# Patient Record
Sex: FEMALE | Employment: FULL TIME | URBAN - METROPOLITAN AREA
[De-identification: names, ages, dates, MRNs, and addresses within clinical notes are randomized per-mention and may not be internally consistent; named-entity substitution may affect disease eponyms.]

---

## 2019-05-16 ENCOUNTER — TRANSFERRED RECORDS (OUTPATIENT)
Dept: MULTI SPECIALTY CLINIC | Facility: CLINIC | Age: 59
End: 2019-05-16
Payer: COMMERCIAL

## 2019-05-16 LAB
CHOLESTEROL (EXTERNAL): 239 MG/DL
HDLC SERPL-MCNC: 55 MG/DL
HPV ABSTRACT: NORMAL
LDL CHOLESTEROL (EXTERNAL): 153 MG/DL
NON HDL CHOLESTEROL (EXTERNAL): 184 MG/DL
PAP SMEAR - HIM PATIENT REPORTED: NORMAL
PAP-ABSTRACT: NORMAL
TRIGLYCERIDES (EXTERNAL): 154 MG/DL

## 2021-10-18 ENCOUNTER — TRANSFERRED RECORDS (OUTPATIENT)
Dept: HEALTH INFORMATION MANAGEMENT | Facility: CLINIC | Age: 61
End: 2021-10-18
Payer: COMMERCIAL

## 2021-10-21 ENCOUNTER — TRANSFERRED RECORDS (OUTPATIENT)
Dept: MULTI SPECIALTY CLINIC | Facility: CLINIC | Age: 61
End: 2021-10-21
Payer: COMMERCIAL

## 2022-01-08 ENCOUNTER — HEALTH MAINTENANCE LETTER (OUTPATIENT)
Age: 62
End: 2022-01-08

## 2022-01-08 ENCOUNTER — LAB REQUISITION (OUTPATIENT)
Dept: LAB | Facility: CLINIC | Age: 62
End: 2022-01-08
Payer: COMMERCIAL

## 2022-01-08 DIAGNOSIS — Z03.818 ENCOUNTER FOR OBSERVATION FOR SUSPECTED EXPOSURE TO OTHER BIOLOGICAL AGENTS RULED OUT: ICD-10-CM

## 2022-01-08 PROCEDURE — U0005 INFEC AGEN DETEC AMPLI PROBE: HCPCS | Mod: ORL | Performed by: FAMILY MEDICINE

## 2022-01-10 ENCOUNTER — VIRTUAL VISIT (OUTPATIENT)
Dept: PEDIATRICS | Facility: CLINIC | Age: 62
End: 2022-01-10
Payer: COMMERCIAL

## 2022-01-10 DIAGNOSIS — Z13.1 SCREENING FOR DIABETES MELLITUS: ICD-10-CM

## 2022-01-10 DIAGNOSIS — Z76.89 ENCOUNTER TO ESTABLISH CARE: Primary | ICD-10-CM

## 2022-01-10 DIAGNOSIS — I10 BENIGN ESSENTIAL HYPERTENSION: ICD-10-CM

## 2022-01-10 DIAGNOSIS — Z13.220 SCREENING CHOLESTEROL LEVEL: ICD-10-CM

## 2022-01-10 PROBLEM — Z86.59 HISTORY OF EATING DISORDER: Status: ACTIVE | Noted: 2022-01-10

## 2022-01-10 LAB — SARS-COV-2 RNA RESP QL NAA+PROBE: NEGATIVE

## 2022-01-10 PROCEDURE — 99203 OFFICE O/P NEW LOW 30 MIN: CPT | Mod: 95 | Performed by: INTERNAL MEDICINE

## 2022-01-10 RX ORDER — AMLODIPINE BESYLATE 5 MG/1
5 TABLET ORAL
COMMUNITY
Start: 2021-10-01 | End: 2022-02-02

## 2022-01-10 NOTE — PROGRESS NOTES
Called and spoke with patient.  Patient is scheduled for nurse visit (fasting labs/bp check) 01/19/2022 at 10:30 am.  LUCAS's mailed to patient along with return envelope to obtain records requested by Dr. Keith.    Clarisse Cooper  Lead

## 2022-01-10 NOTE — PROGRESS NOTES
Debo is a 61 year old who is being evaluated via a billable video visit.      How would you like to obtain your AVS? MyChart  If the video visit is dropped, the invitation should be resent by: Text to cell phone: [phone]  Will anyone else be joining your video visit? No      Video Start Time: 3:49 PM    Assessment & Plan       ICD-10-CM    1. Encounter to establish care  Z76.89    2. Benign essential hypertension  I10      --------------------------  PATIENT INSTRUCTIONS    Patient Instructions   Great to meet you today!    We'll have you over to clinic for fasting labs and a blood pressure check. I will then send in refills for your amlodipine.     We'll work on getting records from [facility] and [facility] - that will help me figure out when we should do an in-person clinic visit (likely this summer)    Be in touch if you need anything!      --------------------------                 Return in about 6 months (around 7/10/2022) for Follow Up.    Carmine Keith MD  [facility] [place]    Subjective   Debo is a 61 year old who presents for the following health issues     HPI     # Weight  # History of eating disorder - binging and purging  - therapy was helpful    # Blood pressure  BP Readings from Last 6 Encounters:   07/07/09 110/64   01/30/09 132/82   11/09/07 100/68   01/09/07 110/74   11/14/06 112/72   10/17/06 118/70     - prior year it was borderline,  - most recent visit was 150/80  - started amlodipine 5mg - told her she needed an internist   - 110s/70s    # HCM  - [facility] for [facility], skipped a year because of covid  --- utd pap  - mammo [facility] 10/2021 wnl  - went to [facility] for colon cancer screening - within the last 5 years; everything was normal, come back in 10 years.    - due lipids, screening labs  - flu shot 11/23/21 (approx)     Review of Systems         Objective           Vitals:  No vitals were obtained today due to virtual visit.    Physical Exam   GENERAL:  Healthy, alert and no distress  EYES: Eyes grossly normal to inspection.  No discharge or erythema, or obvious scleral/conjunctival abnormalities.  RESP: No audible wheeze, cough, or visible cyanosis.  No visible retractions or increased work of breathing.    SKIN: Visible skin clear. No significant rash, abnormal pigmentation or lesions.  NEURO: Cranial nerves grossly intact.  Mentation and speech appropriate for age.  PSYCH: Mentation appears normal, affect normal/bright, judgement and insight intact, normal speech and appearance well-groomed.                Video-Visit Details    Type of service:  Video Visit    Video End Time:4:08 PM    Originating Location (pt. Location): Home    Distant Location (provider location):  [facility] [employer]     Platform used for Video Visit: [employer]

## 2022-01-10 NOTE — PATIENT INSTRUCTIONS
Great to meet you today!    We'll have you over to clinic for fasting labs and a blood pressure check. I will then send in refills for your amlodipine.     We'll work on getting records from [facility] and [facility] - that will help me figure out when we should do an in-person clinic visit (likely this summer)    Be in touch if you need anything!

## 2022-02-01 ENCOUNTER — ALLIED HEALTH/NURSE VISIT (OUTPATIENT)
Dept: PEDIATRICS | Facility: CLINIC | Age: 62
End: 2022-02-01
Payer: COMMERCIAL

## 2022-02-01 VITALS — SYSTOLIC BLOOD PRESSURE: 138 MMHG | DIASTOLIC BLOOD PRESSURE: 88 MMHG | HEART RATE: 86 BPM

## 2022-02-01 DIAGNOSIS — Z13.220 SCREENING CHOLESTEROL LEVEL: ICD-10-CM

## 2022-02-01 DIAGNOSIS — Z13.1 SCREENING FOR DIABETES MELLITUS: ICD-10-CM

## 2022-02-01 DIAGNOSIS — I10 BENIGN ESSENTIAL HYPERTENSION: ICD-10-CM

## 2022-02-01 PROBLEM — R73.01 ELEVATED FASTING GLUCOSE: Status: ACTIVE | Noted: 2022-02-01

## 2022-02-01 LAB
ALBUMIN SERPL-MCNC: 3.2 G/DL (ref 3.4–5)
ALP SERPL-CCNC: 69 U/L (ref 40–150)
ALT SERPL W P-5'-P-CCNC: 23 U/L (ref 0–50)
ANION GAP SERPL CALCULATED.3IONS-SCNC: 5 MMOL/L (ref 3–14)
AST SERPL W P-5'-P-CCNC: 12 U/L (ref 0–45)
BILIRUB SERPL-MCNC: 0.3 MG/DL (ref 0.2–1.3)
BUN SERPL-MCNC: 14 MG/DL (ref 7–30)
CALCIUM SERPL-MCNC: 9 MG/DL (ref 8.5–10.1)
CHLORIDE BLD-SCNC: 105 MMOL/L (ref 94–109)
CHOLEST SERPL-MCNC: 206 MG/DL
CO2 SERPL-SCNC: 27 MMOL/L (ref 20–32)
CREAT SERPL-MCNC: 0.62 MG/DL (ref 0.52–1.04)
CREAT UR-MCNC: 74 MG/DL
ERYTHROCYTE [DISTWIDTH] IN BLOOD BY AUTOMATED COUNT: 12.7 % (ref 10–15)
FASTING STATUS PATIENT QL REPORTED: YES
GFR SERPL CREATININE-BSD FRML MDRD: >90 ML/MIN/1.73M2
GLUCOSE BLD-MCNC: 107 MG/DL (ref 70–99)
HCT VFR BLD AUTO: 44.2 % (ref 35–47)
HDLC SERPL-MCNC: 50 MG/DL
HGB BLD-MCNC: 14.4 G/DL (ref 11.7–15.7)
LDLC SERPL CALC-MCNC: 119 MG/DL
MCH RBC QN AUTO: 29 PG (ref 26.5–33)
MCHC RBC AUTO-ENTMCNC: 32.6 G/DL (ref 31.5–36.5)
MCV RBC AUTO: 89 FL (ref 78–100)
MICROALBUMIN UR-MCNC: 67 MG/L
MICROALBUMIN/CREAT UR: 90.54 MG/G CR (ref 0–25)
NONHDLC SERPL-MCNC: 156 MG/DL
PLATELET # BLD AUTO: 397 10E3/UL (ref 150–450)
POTASSIUM BLD-SCNC: 4 MMOL/L (ref 3.4–5.3)
PROT SERPL-MCNC: 7.6 G/DL (ref 6.8–8.8)
RBC # BLD AUTO: 4.96 10E6/UL (ref 3.8–5.2)
SODIUM SERPL-SCNC: 137 MMOL/L (ref 133–144)
TRIGL SERPL-MCNC: 187 MG/DL
TSH SERPL DL<=0.005 MIU/L-ACNC: 3.52 MU/L (ref 0.4–4)
WBC # BLD AUTO: 10.3 10E3/UL (ref 4–11)

## 2022-02-01 PROCEDURE — 85027 COMPLETE CBC AUTOMATED: CPT

## 2022-02-01 PROCEDURE — 80061 LIPID PANEL: CPT

## 2022-02-01 PROCEDURE — 80053 COMPREHEN METABOLIC PANEL: CPT

## 2022-02-01 PROCEDURE — 82043 UR ALBUMIN QUANTITATIVE: CPT

## 2022-02-01 PROCEDURE — 99207 PR NO CHARGE NURSE ONLY: CPT

## 2022-02-01 PROCEDURE — 36415 COLL VENOUS BLD VENIPUNCTURE: CPT

## 2022-02-01 PROCEDURE — 84443 ASSAY THYROID STIM HORMONE: CPT

## 2022-02-01 NOTE — PROGRESS NOTES
BP borderline here.     She works in healthcare. Has she been checking it at work? Is it still running 110s/70s for her? If so I'll refill the amlodipine 5. Otherwise if running higher/closer to our check in clinic I'll likely increase to amlodipine 10.     ALICJA Keith MD  Internal Medicine-Pediatrics

## 2022-02-01 NOTE — PROGRESS NOTES
Debo Grimes is a 61 year old patient who comes in today for a Blood Pressure check.  Initial BP:  /88 (BP Location: Right arm, Patient Position: Chair, Cuff Size: Adult Large)   Pulse 86          1st readin/100  P: 88     2nd readin/88 P: 86      Disposition: results routed to provider    No complaints.     No side effects.    No symptoms.      Arleen Harrell MA// 2022

## 2022-02-01 NOTE — PROGRESS NOTES
Called and left message for patient requesting a call back. Tali Carrero RN on 2/1/2022 at 2:05 PM

## 2022-02-02 ENCOUNTER — MYC MEDICAL ADVICE (OUTPATIENT)
Dept: PEDIATRICS | Facility: CLINIC | Age: 62
End: 2022-02-02
Payer: COMMERCIAL

## 2022-02-02 RX ORDER — LISINOPRIL 20 MG/1
20 TABLET ORAL DAILY
Qty: 90 TABLET | Refills: 0 | Status: SHIPPED | OUTPATIENT
Start: 2022-02-02 | End: 2022-05-05

## 2022-02-04 NOTE — TELEPHONE ENCOUNTER
Per chart review:   lisinopril (ZESTRIL) 20 MG tablet 90 tablet 0 2/2/2022  --   Sig - Route: Take 1 tablet (20 mg) by mouth daily - Oral   Sent to pharmacy as: Lisinopril 20 MG Oral Tablet (ZESTRIL)   Class: E-Prescribe   Order: [id number]   E-Prescribing Status: Receipt confirmed by pharmacy (2/2/2022  1:15 PM CST)     [employer] message sent to patient.     Ata CALVERT RN

## 2022-02-09 ENCOUNTER — TELEPHONE (OUTPATIENT)
Dept: PEDIATRICS | Facility: CLINIC | Age: 62
End: 2022-02-09
Payer: COMMERCIAL

## 2022-02-09 NOTE — TELEPHONE ENCOUNTER
She filled out 2 ROIs last visit and I do not see records yet:    LUCAS for [facility] - colonoscopy results last 5 years   LUCAS for [facility]  - labs, last 5 years of paps, appt notes last 3 years     Thank you!  ALICJA Keith MD  Internal Medicine-Pediatrics

## 2022-02-21 NOTE — TELEPHONE ENCOUNTER
[facility] sent over records. Placed in providers [facility].     Ramona Leija MA

## 2022-05-04 DIAGNOSIS — I10 BENIGN ESSENTIAL HYPERTENSION: ICD-10-CM

## 2022-05-05 RX ORDER — LISINOPRIL 20 MG/1
20 TABLET ORAL DAILY
Qty: 90 TABLET | Refills: 0 | Status: SHIPPED | OUTPATIENT
Start: 2022-05-05 | End: 2022-08-18

## 2022-05-05 NOTE — TELEPHONE ENCOUNTER
Prescription approved per [facility] Refill Protocol.    01/10/2022 Carmine Keith MD Virtual Visit  Return in about 6 months (around 7/10/2022) for Follow Up.     Cielo Aguilera RN

## 2022-05-18 NOTE — TELEPHONE ENCOUNTER
We got [facility] records but still need [facility] (pap).     Thanks!   ALICJA Keith MD  Internal Medicine-Pediatrics

## 2022-05-23 NOTE — TELEPHONE ENCOUNTER
Last pap 5/16/19 - records will be abstracted.     ALICJA Keith MD  Internal Medicine-Pediatrics

## 2022-08-15 DIAGNOSIS — I10 BENIGN ESSENTIAL HYPERTENSION: ICD-10-CM

## 2022-08-18 RX ORDER — LISINOPRIL 20 MG/1
20 TABLET ORAL DAILY
Qty: 90 TABLET | Refills: 1 | Status: SHIPPED | OUTPATIENT
Start: 2022-08-18 | End: 2023-02-12

## 2022-08-18 NOTE — TELEPHONE ENCOUNTER
Prescription approved per [facility] Refill Protocol.  Gracy Loya, RN, BSN, PHN  [facility]

## 2022-11-20 ENCOUNTER — HEALTH MAINTENANCE LETTER (OUTPATIENT)
Age: 62
End: 2022-11-20

## 2022-12-24 ENCOUNTER — HEALTH MAINTENANCE LETTER (OUTPATIENT)
Age: 62
End: 2022-12-24

## 2023-01-13 ENCOUNTER — E-VISIT (OUTPATIENT)
Dept: PEDIATRICS | Facility: CLINIC | Age: 63
End: 2023-01-13
Payer: COMMERCIAL

## 2023-01-13 DIAGNOSIS — M10.9 ACUTE GOUT OF ANKLE, UNSPECIFIED CAUSE, UNSPECIFIED LATERALITY: Primary | ICD-10-CM

## 2023-01-13 PROCEDURE — 99422 OL DIG E/M SVC 11-20 MIN: CPT | Performed by: INTERNAL MEDICINE

## 2023-01-16 RX ORDER — INDOMETHACIN 50 MG/1
50 CAPSULE ORAL
Qty: 30 CAPSULE | Refills: 0 | Status: SHIPPED | OUTPATIENT
Start: 2023-01-16 | End: 2023-11-08

## 2023-01-17 NOTE — PATIENT INSTRUCTIONS
Thank you for choosing us for your care. I have placed an order for a prescription so that you can start treatment. View your full visit summary for details by clicking on the link below. Your pharmacist will able to address any questions you may have about the medication.     If you're not feeling better within 5-7 days, please schedule an appointment.  You can schedule an appointment right here in [facility], or call [phone]  If the visit is for the same symptoms as your eVisit, we'll refund the cost of your eVisit if seen within seven days.

## 2023-02-10 DIAGNOSIS — I10 BENIGN ESSENTIAL HYPERTENSION: ICD-10-CM

## 2023-02-12 RX ORDER — LISINOPRIL 20 MG/1
20 TABLET ORAL DAILY
Qty: 90 TABLET | Refills: 0 | Status: SHIPPED | OUTPATIENT
Start: 2023-02-12 | End: 2023-09-20

## 2023-09-20 DIAGNOSIS — I10 BENIGN ESSENTIAL HYPERTENSION: ICD-10-CM

## 2023-09-20 NOTE — TELEPHONE ENCOUNTER
Routing refill request to provider for review/approval because:  Drug interaction warning  Labs not current:  potassium, creatinine, BP  Patient needs to be seen because it has been more than 1 year since last office visit.    Ata CALVERT RN 9/20/2023 at 4:00 PM

## 2023-09-20 NOTE — TELEPHONE ENCOUNTER
Medication Question or Refill        What medication are you calling about (include dose and sig)?: Lisinopril 20mg     Preferred Pharmacy: [facility] [phone] [address] [facility]  [address] [address]  Phone: [phone] Fax: [phone] Alternate Fax: [phone]    [id number] - [place] - [address] AT [facility]  [address] [place] [address]  Phone: [phone] Fax: [phone]      Controlled Substance Agreement on file:   CSA -- Patient Level:    CSA: None found at the patient level.       Who prescribed the medication?: Dr. Keith     Do you need a refill? Yes will be out before the appt set for 11/8/23    When did you use the medication last? 9/20/23    Patient offered an appointment? Yes: annual physical and med renewal set for 11/8/23 but will need refill before that     Do you have any questions or concerns?  No      Could we send this information to you in [facility] or would you prefer to receive a phone call?:   Patient would prefer a phone call   Okay to leave a detailed message?: Yes at Cell number on file:    Telephone Information:   Mobile [phone]

## 2023-09-21 RX ORDER — LISINOPRIL 20 MG/1
20 TABLET ORAL DAILY
Qty: 90 TABLET | Refills: 0 | Status: SHIPPED | OUTPATIENT
Start: 2023-09-21 | End: 2023-11-08

## 2023-09-30 ENCOUNTER — TRANSFERRED RECORDS (OUTPATIENT)
Dept: MULTI SPECIALTY CLINIC | Facility: CLINIC | Age: 63
End: 2023-09-30

## 2023-09-30 LAB — PAP SMEAR - HIM PATIENT REPORTED: NORMAL

## 2023-11-01 ASSESSMENT — ENCOUNTER SYMPTOMS
MYALGIAS: 0
DYSURIA: 0
SORE THROAT: 0
HEADACHES: 0
BREAST MASS: 0
FREQUENCY: 0
DIARRHEA: 0
ARTHRALGIAS: 0
NERVOUS/ANXIOUS: 0
PARESTHESIAS: 0
HEARTBURN: 0
SHORTNESS OF BREATH: 0
WEAKNESS: 0
CHILLS: 0
HEMATURIA: 0
JOINT SWELLING: 0
DIZZINESS: 0
HEMATOCHEZIA: 0
EYE PAIN: 0
ABDOMINAL PAIN: 0
NAUSEA: 0
CONSTIPATION: 0
COUGH: 0
FEVER: 0
PALPITATIONS: 0

## 2023-11-06 NOTE — PROGRESS NOTES
Est care 1/2022  # Weight  # History of eating disorder - binging and purging  - therapy was helpful     # Blood pressure  BP Readings from Last 6 Encounters:   02/01/22 138/88   07/07/09 110/64   01/30/09 132/82   11/09/07 100/68   01/09/07 110/74   11/14/06 112/72     - started amlodipine 5mg - told her she needed an internist   - 110s/70s     # HCM  - [facility] for [facility], skipped a year because of covid  --- utd pap  - mammo [facility] 10/2021 wnl  - went to [facility] for colon cancer screening - within the last 5 years; everything was normal, come back in 10 years.     # HCM  - due covid/flu  - due RSV  - due shingles  - mammo completed 9/20/23 [facility]  - colon due 5/2024     Latest Reference Range & Units 02/01/22 10:03   Cholesterol <200 mg/dL 206 (H)   HDL Cholesterol >=50 mg/dL 50   LDL Cholesterol Calculated <=100 mg/dL 119 (H)   Triglycerides <150 mg/dL 187 (H)   (H): Data is abnormally high

## 2023-11-08 ENCOUNTER — OFFICE VISIT (OUTPATIENT)
Dept: PEDIATRICS | Facility: CLINIC | Age: 63
End: 2023-11-08
Payer: COMMERCIAL

## 2023-11-08 VITALS
HEART RATE: 104 BPM | OXYGEN SATURATION: 96 % | BODY MASS INDEX: 50.02 KG/M2 | TEMPERATURE: 97.6 F | HEIGHT: 64 IN | WEIGHT: 293 LBS | SYSTOLIC BLOOD PRESSURE: 118 MMHG | RESPIRATION RATE: 16 BRPM | DIASTOLIC BLOOD PRESSURE: 76 MMHG

## 2023-11-08 DIAGNOSIS — Z11.4 SCREENING FOR HIV (HUMAN IMMUNODEFICIENCY VIRUS): ICD-10-CM

## 2023-11-08 DIAGNOSIS — Z11.59 NEED FOR HEPATITIS C SCREENING TEST: ICD-10-CM

## 2023-11-08 DIAGNOSIS — Z00.00 ROUTINE GENERAL MEDICAL EXAMINATION AT A HEALTH CARE FACILITY: Primary | ICD-10-CM

## 2023-11-08 DIAGNOSIS — I10 BENIGN ESSENTIAL HYPERTENSION: ICD-10-CM

## 2023-11-08 DIAGNOSIS — Z13.1 SCREENING FOR DIABETES MELLITUS: ICD-10-CM

## 2023-11-08 DIAGNOSIS — Z12.11 COLON CANCER SCREENING: ICD-10-CM

## 2023-11-08 PROBLEM — R73.03 PREDIABETES: Status: ACTIVE | Noted: 2022-02-01

## 2023-11-08 LAB
ANION GAP SERPL CALCULATED.3IONS-SCNC: 11 MMOL/L (ref 7–15)
BUN SERPL-MCNC: 11.5 MG/DL (ref 8–23)
CALCIUM SERPL-MCNC: 9.7 MG/DL (ref 8.8–10.2)
CHLORIDE SERPL-SCNC: 100 MMOL/L (ref 98–107)
CREAT SERPL-MCNC: 0.88 MG/DL (ref 0.51–0.95)
DEPRECATED HCO3 PLAS-SCNC: 24 MMOL/L (ref 22–29)
EGFRCR SERPLBLD CKD-EPI 2021: 74 ML/MIN/1.73M2
GLUCOSE SERPL-MCNC: 101 MG/DL (ref 70–99)
HBA1C MFR BLD: 6.1 % (ref 0–5.6)
POTASSIUM SERPL-SCNC: 4.7 MMOL/L (ref 3.4–5.3)
SODIUM SERPL-SCNC: 135 MMOL/L (ref 135–145)

## 2023-11-08 PROCEDURE — 90678 RSV VACC PREF BIVALENT IM: CPT | Performed by: INTERNAL MEDICINE

## 2023-11-08 PROCEDURE — 99396 PREV VISIT EST AGE 40-64: CPT | Mod: 25 | Performed by: INTERNAL MEDICINE

## 2023-11-08 PROCEDURE — 91320 SARSCV2 VAC 30MCG TRS-SUC IM: CPT | Performed by: INTERNAL MEDICINE

## 2023-11-08 PROCEDURE — 36415 COLL VENOUS BLD VENIPUNCTURE: CPT | Performed by: INTERNAL MEDICINE

## 2023-11-08 PROCEDURE — 99213 OFFICE O/P EST LOW 20 MIN: CPT | Mod: 25 | Performed by: INTERNAL MEDICINE

## 2023-11-08 PROCEDURE — 90471 IMMUNIZATION ADMIN: CPT | Performed by: INTERNAL MEDICINE

## 2023-11-08 PROCEDURE — 80048 BASIC METABOLIC PNL TOTAL CA: CPT | Performed by: INTERNAL MEDICINE

## 2023-11-08 PROCEDURE — 90480 ADMN SARSCOV2 VAC 1/ONLY CMP: CPT | Performed by: INTERNAL MEDICINE

## 2023-11-08 PROCEDURE — 86803 HEPATITIS C AB TEST: CPT | Performed by: INTERNAL MEDICINE

## 2023-11-08 PROCEDURE — 83036 HEMOGLOBIN GLYCOSYLATED A1C: CPT | Performed by: INTERNAL MEDICINE

## 2023-11-08 PROCEDURE — 87389 HIV-1 AG W/HIV-1&-2 AB AG IA: CPT | Performed by: INTERNAL MEDICINE

## 2023-11-08 PROCEDURE — 90472 IMMUNIZATION ADMIN EACH ADD: CPT | Performed by: INTERNAL MEDICINE

## 2023-11-08 PROCEDURE — 90686 IIV4 VACC NO PRSV 0.5 ML IM: CPT | Performed by: INTERNAL MEDICINE

## 2023-11-08 RX ORDER — LISINOPRIL 20 MG/1
20 TABLET ORAL DAILY
Qty: 90 TABLET | Refills: 3 | Status: SHIPPED | OUTPATIENT
Start: 2023-11-08

## 2023-11-08 ASSESSMENT — PAIN SCALES - GENERAL: PAINLEVEL: MILD PAIN (2)

## 2023-11-08 ASSESSMENT — ENCOUNTER SYMPTOMS
MYALGIAS: 0
SORE THROAT: 0
CONSTIPATION: 0
BREAST MASS: 0
COUGH: 0
DIZZINESS: 0
DYSURIA: 0
CHILLS: 0
WEAKNESS: 0
HEMATOCHEZIA: 0
SHORTNESS OF BREATH: 0
FREQUENCY: 0
HEARTBURN: 0
JOINT SWELLING: 0
PARESTHESIAS: 0
NERVOUS/ANXIOUS: 0
FEVER: 0
HEADACHES: 0
EYE PAIN: 0
ARTHRALGIAS: 0
PALPITATIONS: 0
NAUSEA: 0
ABDOMINAL PAIN: 0
HEMATURIA: 0
DIARRHEA: 0

## 2023-11-08 NOTE — PROGRESS NOTES
SUBJECTIVE:   CC: Debo is an 63 year old who presents for preventive health visit.       11/8/2023    10:51 AM   Additional Questions   Roomed by josesito   Accompanied by self         11/8/2023    10:51 AM   Patient Reported Additional Medications   Patient reports taking the following new medications no       Healthy Habits:     Getting at least 3 servings of Calcium per day:  NO    Bi-annual eye exam:  Yes    Dental care twice a year:  Yes    Sleep apnea or symptoms of sleep apnea:  None    Diet:  Regular (no restrictions)    Frequency of exercise:  4-5 days/week    Duration of exercise:  15-30 minutes    Taking medications regularly:  Yes    Medication side effects:  None    Additional concerns today:  Yes    # Weight  # History of eating disorder - binging and purging  - therapy was helpful     # Blood pressure  BP Readings from Last 6 Encounters:   11/08/23 118/76   02/01/22 138/88   07/07/09 110/64   01/30/09 132/82   11/09/07 100/68   01/09/07 110/74     - amlodipine 5mg     # HCM  - [facility]  --- utd pap  - due covid/flu  - due RSV  - due shingles  - mammo completed 9/20/23 [facility]  - colon due 5/2024     Latest Reference Range & Units 02/01/22 10:03   Cholesterol <200 mg/dL 206 (H)   HDL Cholesterol >=50 mg/dL 50   LDL Cholesterol Calculated <=100 mg/dL 119 (H)   Triglycerides <150 mg/dL 187 (H)   (H): Data is abnormally high    Today's PHQ-2 Score:       11/8/2023     5:40 AM   PHQ-2 ( 1999 Pf[employer])   Q1: Little interest or pleasure in doing things 0   Q2: Feeling down, depressed or hopeless 0   PHQ-2 Score 0   Q1: Little interest or pleasure in doing things Not at all   Q2: Feeling down, depressed or hopeless Not at all   PHQ-2 Score 0       ave you ever done Advance Care Planning? (For example, a Health Directive, POLST, or a discussion with a medical provider or your loved ones about your wishes): No, advance care planning information given to patient to review.  Patient plans to discuss  their wishes with loved ones or provider.      Social History     Tobacco Use    Smoking status: Never    Smokeless tobacco: Never   Substance Use Topics    Alcohol use: Yes     Comment: 1-2 drinks/wk             11/1/2023    11:41 AM   Alcohol Use   Prescreen: >3 drinks/day or >7 drinks/week? No     Reviewed orders with patient.  Reviewed health maintenance and updated orders accordingly - Yes    Breast Cancer Screening:    FHS-7:       11/1/2023    11:47 AM   Breast CA Risk Assessment (FHS-7)   Did any of your first-degree relatives have breast or ovarian cancer? No   Did any of your relatives have bilateral breast cancer? No   Did any man in your family have breast cancer? No   Did any woman in your family have breast and ovarian cancer? Yes   Did any woman in your family have breast cancer before age 50 y? No   Do you have 2 or more relatives with breast and/or ovarian cancer? Yes   Do you have 2 or more relatives with breast and/or bowel cancer? Yes     Pertinent mammograms are reviewed under the imaging tab.    History of abnormal Pap smear: NO - age 30-65 PAP every 5 years with negative HPV co-testing recommended      1/30/2009    12:00 AM 8/12/2005    12:00 AM   PAP / HPV   PAP (Historical) NIL  NIL      Reviewed and updated as needed this visit by clinical staff   Tobacco  Allergies  Meds              Reviewed and updated as needed this visit by Provider                   Review of Systems   Constitutional:  Negative for chills and fever.   HENT:  Negative for congestion, ear pain, hearing loss and sore throat.    Eyes:  Negative for pain and visual disturbance.   Respiratory:  Negative for cough and shortness of breath.    Cardiovascular:  Negative for chest pain, palpitations and peripheral edema.   Gastrointestinal:  Negative for abdominal pain, constipation, diarrhea, heartburn, hematochezia and nausea.   Breasts:  Negative for tenderness, breast mass and discharge.   Genitourinary:  Negative for  "dysuria, frequency, genital sores, hematuria, pelvic pain, urgency, vaginal bleeding and vaginal discharge.   Musculoskeletal:  Negative for arthralgias, joint swelling and myalgias.   Skin:  Negative for rash.   Neurological:  Negative for dizziness, weakness, headaches and paresthesias.   Psychiatric/Behavioral:  Negative for mood changes. The patient is not nervous/anxious.         OBJECTIVE:   /76 (Cuff Size: Adult Large)   Pulse 104   Temp 97.6  F (36.4  C) (Tympanic)   Resp 16   Ht 1.613 m (5' 3.5\")   Wt 135.6 kg (299 lb)   SpO2 96%   BMI 52.13 kg/m    Physical Exam  GENERAL: healthy, alert and no distress  EYES: Eyes grossly normal to inspection, PERRL and conjunctivae and sclerae normal  HENT: ear canals and TM's normal, nose and mouth without ulcers or lesions  NECK: no adenopathy, no asymmetry, masses, or scars and thyroid normal to palpation  RESP: lungs clear to auscultation - no rales, rhonchi or wheezes  CV: regular rate and rhythm, normal S1 S2, no S3 or S4, no murmur, click or rub, no peripheral edema and peripheral pulses strong  ABDOMEN: soft, nontender, no hepatosplenomegaly, no masses and bowel sounds normal  MS: no gross musculoskeletal defects noted, no edema  SKIN: no suspicious lesions or rashes  NEURO: Normal strength and tone, mentation intact and speech normal  PSYCH: mentation appears normal, affect normal/bright    Diagnostic Test Results:  Labs reviewed in Epic    ASSESSMENT/PLAN:       ICD-10-CM    1. Routine general medical examination at a health care facility  Z00.00       2. Benign essential hypertension  I10 lisinopril (ZESTRIL) 20 MG tablet     Basic metabolic panel  (Ca, Cl, CO2, Creat, Gluc, K, Na, BUN)     OFFICE/OUTPT VISIT,EST,LEVL III     Basic metabolic panel  (Ca, Cl, CO2, Creat, Gluc, K, Na, BUN)      3. Need for hepatitis C screening test  Z11.59 Hepatitis C Screen Reflex to HCV RNA Quant and Genotype     Hepatitis C Screen Reflex to HCV RNA Quant and " Genotype      4. Screening for HIV (human immunodeficiency virus)  Z11.4 HIV Antigen Antibody Combo     HIV Antigen Antibody Combo      5. Screening for diabetes mellitus  Z13.1 Hemoglobin A1c     Hemoglobin A1c      6. Colon cancer screening  Z12.11 Colonoscopy Screening  Referral        Doing great - assisted is wonderful!      COUNSELING:  Reviewed preventive health counseling, as reflected in patient instructions        She reports that she has never smoked. She has never used smokeless tobacco.        Carmine Keith MD  [facility]

## 2023-11-08 NOTE — PATIENT INSTRUCTIONS
If you are over 50 I recommend the Shingrix vaccine. Two doses of Shingrix provides more than 90% protection against shingles and postherpetic neuralgia (PHN), a type of chronic pain that is the most common complication of shingles.   - even if you've had the older shingles vaccine (Zostavax) it's okay to get the new vaccine.   - even if you've had singles before the vaccine can be helpful.    Typically the best (and cheapest!) place to get this vaccine is our pharmacy downstairs. The vaccine is a two shot series - I recommend getting the second shot 2-6 months after the first dose.    You may have a sore arm and feel mild flu-like symptoms for a day or two after the vaccine. Most people do not need to adjust their regular activities. It's okay to take tylenol or ibuprofen if you have side effects.      Preventive Health Recommendations  Female Ages 50 - 64    Yearly exam: See your health care provider every year in order to  Review health changes.   Discuss preventive care.    Review your medicines if your doctor has prescribed any.    Get a Pap test every three years (unless you have an abnormal result and your provider advises testing more often).  If you get Pap tests with HPV test, you only need to test every 5 years, unless you have an abnormal result.   You do not need a Pap test if your uterus was removed (hysterectomy) and you have not had cancer.  You should be tested each year for STDs (sexually transmitted diseases) if you're at risk.   Have a mammogram every 1 to 2 years.  Have a colonoscopy at age 45, or have a yearly FIT test (stool test). These exams screen for colon cancer.    Have a cholesterol test every 5 years, or more often if advised.  Have a diabetes test (fasting glucose) every three years. If you are at risk for diabetes, you should have this test more often.   If you are at risk for osteoporosis (brittle bone disease), think about having a bone density scan (DEXA).    Shots: Get a flu shot  each year. Get a tetanus shot every 10 years.    Nutrition:   Eat at least 5 servings of fruits and vegetables each day.  Eat whole-grain bread, whole-wheat pasta and brown rice instead of white grains and rice.  Get adequate Calcium and Vitamin D.     Lifestyle  Exercise at least 150 minutes a week (30 minutes a day, 5 days a week). This will help you control your weight and prevent disease.  Limit alcohol to one drink per day.  No smoking.   Wear sunscreen to prevent skin cancer.   See your dentist every six months for an exam and cleaning.  See your eye doctor every 1 to 2 years.

## 2023-11-09 LAB
HCV AB SERPL QL IA: NONREACTIVE
HIV 1+2 AB+HIV1 P24 AG SERPL QL IA: NONREACTIVE

## 2025-01-04 ENCOUNTER — HEALTH MAINTENANCE LETTER (OUTPATIENT)
Age: 65
End: 2025-01-04

## 2025-01-31 DIAGNOSIS — I10 BENIGN ESSENTIAL HYPERTENSION: ICD-10-CM

## 2025-01-31 RX ORDER — LISINOPRIL 20 MG/1
20 TABLET ORAL DAILY
Qty: 90 TABLET | Refills: 0 | Status: SHIPPED | OUTPATIENT
Start: 2025-01-31

## 2025-01-31 NOTE — TELEPHONE ENCOUNTER
Due for Wellness - I am precepting several of the next Wed AMs - would she be okay with seeing resident/me in resident clinic?     Also due for Mammo - okay to schedule prior to seeing me.    ALICJA Keith MD  Internal Medicine-Pediatrics

## 2025-01-31 NOTE — LETTER
[facility]  [address]  [address]  [place] [address]  Phone: [phone]  Fax: [phone]        February 5, 2025      Debo Grimes                                                                                                                                [address] [place] [address]            Dear Ms. Grimes,    We have attempted to reach you multiple times because we are concerned about your health care.  We recently provided you with a medication refill.  Many medications require routine follow-up with your Doctor.      At this time we ask that: You schedule an appointment for your annual physical and a mammogram.    Your prescription lisinopril (ZESTRIL) 20 MG tablet: Has been refilled for 90 day supply only so you may have time for the above noted follow-up.    Please call us at [phone] to schedule your annual preventative as well as your mammogram. If you have any questions or concerns, you can call the number mentioned above as well.     Thank you,      [facility] Care Team

## 2025-04-23 ENCOUNTER — OFFICE VISIT (OUTPATIENT)
Dept: PEDIATRICS | Facility: CLINIC | Age: 65
End: 2025-04-23
Payer: COMMERCIAL

## 2025-04-23 VITALS
RESPIRATION RATE: 16 BRPM | HEIGHT: 64 IN | WEIGHT: 286.9 LBS | SYSTOLIC BLOOD PRESSURE: 130 MMHG | BODY MASS INDEX: 48.98 KG/M2 | OXYGEN SATURATION: 98 % | TEMPERATURE: 97.4 F | HEART RATE: 81 BPM | DIASTOLIC BLOOD PRESSURE: 88 MMHG

## 2025-04-23 DIAGNOSIS — I10 BENIGN ESSENTIAL HYPERTENSION: ICD-10-CM

## 2025-04-23 DIAGNOSIS — E55.9 VITAMIN D DEFICIENCY: ICD-10-CM

## 2025-04-23 DIAGNOSIS — Z13.1 SCREENING FOR DIABETES MELLITUS: ICD-10-CM

## 2025-04-23 DIAGNOSIS — R73.03 PREDIABETES: ICD-10-CM

## 2025-04-23 DIAGNOSIS — Z13.220 SCREENING CHOLESTEROL LEVEL: ICD-10-CM

## 2025-04-23 DIAGNOSIS — Z00.00 ROUTINE GENERAL MEDICAL EXAMINATION AT A HEALTH CARE FACILITY: Primary | ICD-10-CM

## 2025-04-23 LAB
EST. AVERAGE GLUCOSE BLD GHB EST-MCNC: 137 MG/DL
HBA1C MFR BLD: 6.4 % (ref 0–5.6)

## 2025-04-23 PROCEDURE — 90472 IMMUNIZATION ADMIN EACH ADD: CPT | Performed by: INTERNAL MEDICINE

## 2025-04-23 PROCEDURE — 36415 COLL VENOUS BLD VENIPUNCTURE: CPT | Performed by: INTERNAL MEDICINE

## 2025-04-23 PROCEDURE — 90677 PCV20 VACCINE IM: CPT | Performed by: INTERNAL MEDICINE

## 2025-04-23 PROCEDURE — 82306 VITAMIN D 25 HYDROXY: CPT | Performed by: INTERNAL MEDICINE

## 2025-04-23 PROCEDURE — 90750 HZV VACC RECOMBINANT IM: CPT | Performed by: INTERNAL MEDICINE

## 2025-04-23 PROCEDURE — 99396 PREV VISIT EST AGE 40-64: CPT | Mod: 25 | Performed by: INTERNAL MEDICINE

## 2025-04-23 PROCEDURE — 80061 LIPID PANEL: CPT | Performed by: INTERNAL MEDICINE

## 2025-04-23 PROCEDURE — 90471 IMMUNIZATION ADMIN: CPT | Performed by: INTERNAL MEDICINE

## 2025-04-23 PROCEDURE — 83036 HEMOGLOBIN GLYCOSYLATED A1C: CPT | Performed by: INTERNAL MEDICINE

## 2025-04-23 PROCEDURE — 80053 COMPREHEN METABOLIC PANEL: CPT | Performed by: INTERNAL MEDICINE

## 2025-04-23 RX ORDER — LISINOPRIL 20 MG/1
20 TABLET ORAL DAILY
Qty: 90 TABLET | Refills: 3 | Status: SHIPPED | OUTPATIENT
Start: 2025-04-23

## 2025-04-23 SDOH — HEALTH STABILITY: PHYSICAL HEALTH: ON AVERAGE, HOW MANY DAYS PER WEEK DO YOU ENGAGE IN MODERATE TO STRENUOUS EXERCISE (LIKE A BRISK WALK)?: 3 DAYS

## 2025-04-23 SDOH — HEALTH STABILITY: PHYSICAL HEALTH: ON AVERAGE, HOW MANY MINUTES DO YOU ENGAGE IN EXERCISE AT THIS LEVEL?: 20 MIN

## 2025-04-23 ASSESSMENT — PAIN SCALES - GENERAL: PAINLEVEL_OUTOF10: NO PAIN (0)

## 2025-04-23 ASSESSMENT — SOCIAL DETERMINANTS OF HEALTH (SDOH): HOW OFTEN DO YOU GET TOGETHER WITH FRIENDS OR RELATIVES?: TWICE A WEEK

## 2025-04-23 NOTE — PATIENT INSTRUCTIONS
Rechecking blood pressure in 6 months (don't need to see me)    GLP1s  - Zepbound, Saxenda, Wegovy    [facility] is probably my best resource for up-to-date GLP1 info. They will call you to schedule- tell them virtual.    Schedule mammo and with [facility] (have them send me the records when done).     ----------------    Options for continuing GLP1/GIP agonist in 2025:  Pay out of pocket for Wegovy or Zepbound pens (>$1000/month cash price without savings card)   Zepbound cost with Zepbound savings card (link below to sign up for this): $650/month with card  https://www.enrollment.[employer] Health.[employer])/enroll/checkEnrollment  Wegovy cost with Wegovy savings card (link below to sign up for this): $650/month with card   https://www.[employer]/coverage-and-savings/save-on-wegovy.html    Zepbound Vials through [facility] CASH PAY pharmacy - vials only available in 2.5 mg, 5 mg, 7.5mg, 10 mg doses  [email or web address] --please make an account  $349/month for 2.5mg vials  $499/month for 5mg vials   7.5 mg and 10 mg vials now available with as well for $499/month with regular refills (cost increases if refills not consistently filled - see more info at [employer] website)  $5 per month for administrations supplies (syringe/needles, etc)     Wegovy pen needles through [facility] - 0.25 mg, 0.5mg, 1mg, 1.7mg and 2.4 mg pens  Provides direct-to-patient, convenient home shipments of all dose strengths of Wegovy   $499 per month or 28 day supply for cash-paying patients  https://www.[employer]/obesity/products/wegovy/get-product.html    Liraglutide daily injections may be a more affordable option with GoodRx coupon (around $330/month at some pharmacies      Sublingual Semaglutide at [facility]  [facility] is now offering sublingual semaglutide. This is an alternative option for patients who may not have access to or insurance coverage for  commercially available semaglutide products.  Currently, the FDA-approved forms of semaglutide include:  Rybelsus (oral tablet) - approved for Type 2 Diabetes  Ozempic (injectable) - approved for Type 2 Diabetes  Wegovy (injectable) - approved for chronic weight management and cardiovascular risk reduction in certain populations  Unlike FDA-approved products, sublingual semaglutide has not been studied in large clinical trials, so its effectiveness and safety in this form are not fully known. However, it may be a helpful alternative when other formulations are not accessible.    Sublingual means the medication is placed under the tongue, where it dissolves and is absorbed directly into the bloodstream. At [facility], sublingual semaglutide is made by combining commercially available Rybelsus tablets with a base called Submagna to create a liquid that can be absorbed under the tongue.    Compounding is permitted when a medication is not available in the needed form, like sublingual semaglutide, and a provider writes a prescription for a specific patient. [facility] follows strict safety and quality standards, including United [place] Pharmacopeia (USP) 795 guidelines for non-sterile compounding. Since sublingual semaglutide is not available commercially, it is eligible for compounding under these guidelines.     Availability:  Sublingual semaglutide is expected to be available long term.    Dosing:  Available doses include 0.5 mg, 1 mg, 1.5 mg, 2 mg and 2.5 mg. Further dose escalation above 2.5 mg can be can discussed with your provider based on your individual response and if appropriate.     Sublingual Semaglutide 2 mg/mL   mg mL   0.5 mg 0.25 mL   1 mg 0.5 mL   1.5 mg 0.75 mL   2 mg 1 mL   2.5 mg 1.25 mL     New Start: Begin with 0.5 mg (0.25 mL) once daily for 2-4 weeks. If tolerated, increase to 1 mg (0.5 mL) once daily for at least 4 weeks. If necessary thereafter, dose can  be increased by 0.5 mg (0.25 mL) every 4 weeks (e.g. 1.5 mg, then 2 mg, then 2.5 mg). Follow the dosing and escalation recommendations prescribed by your provider. Do not escalate dosing further than prescribed by your provider.     From injectable Semaglutide: There are no official studies comparing injectable and sublingual semaglutide doses. If you are switching from injectable to sublingual semaglutide, your provider will help choose the most appropriate dose for you.    Administration:  Sublingual semaglutide is administered once daily. Flavor is spearmint similar to Winifred double mint gum or spearmint toothpaste.     Shake well before each use (product has a thick, honey-like consistency)  Use syringe to draw your dose from bottle   Place medication under your tongue and hold under tongue a long as possible (at least 60-90 seconds), then swallow  If the volume feels like too much, you may split the dose into two parts, taken 5-10 minutes apart  Avoid eating, drinking, or smoking for 30 minutes afterward    Helpful Routine Tip: Brush your teeth and rinse your mouth, take your medication, then shower or get ready for the day.    Storage:  Store at room temperature. The medication remains stable for 90 days.    Common Side Effects:  Side effects may be similar to FDA-approved semaglutide products (Rybelsus, Wegovy, Ozempic), though the sublingual version has not been formally studied. Common side effects include: nausea, diarrhea, constipation, headache, indigestion, tiredness (fatigue), stomach upset or abdominal pain. Less commonly, semaglutide can cause low blood sugar (symptoms: shaky, dizzy, sweaty, agitation). Contact your care team if side effects are bothersome or unmanageable or if you are concerned for low blood sugar.     Tips to reduce side effects:   Eat regular meals (don t skip meals)  Stop eating when feeling satiated/satisfied.  Stay hydrated--aim for at least 64 oz of water daily    Obtaining  Medication From Pharmacy:   You will receive an automated call once the pharmacy receives your prescription. You must call back and speak to a team member to confirm name, product, shipping, and cost. If you have not received a call within 3 business days, call the pharmacy directly.    [facility] Phone:  [phone]    Shipping available to: [place], [place], [place], [place], [place], [place], [place].     Managing Refills:  Call [phone]   Download the [employer] dung Download  Online:  [email or web address]    Cost:   0.5 mg and 1 mg doses  ~$180 for 30-day supply  ~$450 for 90-day supply   1.5 mg dose  ~$200-$300 for a 30-day supply   May have cost savings for 90 day supply  2 mg dose  ~$300 for a 30-day supply  May have cost savings for 90 day supply  Dosing higher than 2 mg will be determine by [facility]    Patient Education   Preventive Care Advice   This is general advice given by our system to help you stay healthy. However, your care team may have specific advice just for you. Please talk to your care team about your preventive care needs.  Nutrition  Eat 5 or more servings of fruits and vegetables each day.  Try wheat bread, brown rice and whole grain pasta (instead of white bread, rice, and pasta).  Get enough calcium and vitamin D. Check the label on foods and aim for 100% of the RDA (recommended daily allowance).  Lifestyle  Exercise at least 150 minutes each week  (30 minutes a day, 5 days a week).  Do muscle strengthening activities 2 days a week. These help control your weight and prevent disease.  No smoking.  Wear sunscreen to prevent skin cancer.  Have a dental exam and cleaning every 6 months.  Yearly exams  See your health care team every year to talk about:  Any changes in your health.  Any medicines your care team has prescribed.  Preventive care, family planning, and ways to prevent chronic diseases.  Shots (vaccines)   HPV shots (up to age 26), if you've never had  them before.  Hepatitis B shots (up to age 59), if you've never had them before.  COVID-19 shot: Get this shot when it's due.  Flu shot: Get a flu shot every year.  Tetanus shot: Get a tetanus shot every 10 years.  Pneumococcal, hepatitis A, and RSV shots: Ask your care team if you need these based on your risk.  Shingles shot (for age 50 and up)  General health tests  Diabetes screening:  Starting at age 35, Get screened for diabetes at least every 3 years.  If you are younger than age 35, ask your care team if you should be screened for diabetes.  Cholesterol test: At age 39, start having a cholesterol test every 5 years, or more often if advised.  Bone density scan (DEXA): At age 50, ask your care team if you should have this scan for osteoporosis (brittle bones).  Hepatitis C: Get tested at least once in your life.  STIs (sexually transmitted infections)  Before age 24: Ask your care team if you should be screened for STIs.  After age 24: Get screened for STIs if you're at risk. You are at risk for STIs (including HIV) if:  You are sexually active with more than one person.  You don't use condoms every time.  You or a partner was diagnosed with a sexually transmitted infection.  If you are at risk for HIV, ask about PrEP medicine to prevent HIV.  Get tested for HIV at least once in your life, whether you are at risk for HIV or not.  Cancer screening tests  Cervical cancer screening: If you have a cervix, begin getting regular cervical cancer screening tests starting at age 21.  Breast cancer scan (mammogram): If you've ever had breasts, begin having regular mammograms starting at age 40. This is a scan to check for breast cancer.  Colon cancer screening: It is important to start screening for colon cancer at age 45.  Have a colonoscopy test every 10 years (or more often if you're at risk) Or, ask your provider about stool tests like a FIT test every year or Cologuard test every 3 years.  To learn more about your  testing options, visit:   .  For help making a decision, visit:   https://bit.ly/zw71215.  Prostate cancer screening test: If you have a prostate, ask your care team if a prostate cancer screening test (PSA) at age 55 is right for you.  Lung cancer screening: If you are a current or former smoker ages 50 to 80, ask your care team if ongoing lung cancer screenings are right for you.  For informational purposes only. Not to replace the advice of your health care provider. Copyright   2023 [facility] [employer]. All rights reserved. Clinically reviewed by the [facility]. CardioLogs [id number] - REV 01/24.

## 2025-04-23 NOTE — PROGRESS NOTES
"Preventive Care Visit  [facility] ELOISA Keith MD, Internal Medicine - Pediatrics  Apr 23, 2025      Assessment & Plan       ICD-10-CM    1. Routine general medical examination at a health care facility  Z00.00       2. Benign essential hypertension  I10 lisinopril (ZESTRIL) 20 MG tablet     Comprehensive metabolic panel     Comprehensive metabolic panel      3. Prediabetes  R73.03       4. BMI 50.0-59.9, adult (H)  Z68.43 Comprehensive metabolic panel     Med Therapy Management Referral     Comprehensive metabolic panel      5. Screening cholesterol level  Z13.220 Lipid panel reflex to direct LDL Fasting     Lipid panel reflex to direct LDL Fasting      6. Screening for diabetes mellitus  Z13.1 Hemoglobin A1c     Hemoglobin A1c      7. Vitamin D deficiency  E55.9 Vitamin D Deficiency     Vitamin D Deficiency        Monitoring bp - recheck in 6 mo.     Considering GLP1s - will meet with MTM.    [facility] will be with [facility].    BMI  Estimated body mass index is 50.02 kg/m  as calculated from the following:    Height as of this encounter: 1.613 m (5' 3.5\").    Weight as of this encounter: 130.1 kg (286 lb 14.4 oz).     Counseling  Appropriate preventive services were addressed with this patient via screening, questionnaire, or discussion as appropriate for fall prevention, nutrition, physical activity, Tobacco-use cessation, social engagement, weight loss and cognition.  Checklist reviewing preventive services available has been given to the patient.  Reviewed patient's diet, addressing concerns and/or questions.   She is at risk for lack of exercise and has been provided with information to increase physical activity for the benefit of her well-being.       Subjective   Debo is a 64 year old, presenting for the following:  Physical (Needs refills on blood pressure medication. Please remove allergy of contrast dye per patient request.)        4/23/2025    10:15 AM   Additional " Questions   Roomed by Prudence   Accompanied by self         2025    10:15 AM   Patient Reported Additional Medications   Patient reports taking the following new medications no        Via the Health Maintenance questionnaire, the patient has reported the following services have been completed -Mammogram: Batsheva 2023-Cervical Cancer Screening: Nitin Kiran 2023-Colonscopy: JIMENA 2025, this information has been sent to the abstraction team.    HPI    # Social   - Dec 23 to last July lived w/ her mom who was 89  - now mom with memory issues and she wanted to live in a facility up there  - step dad  in Dec  - goes up once a month and stays with her mom - sleeps on air mattress (this is okay)  - connecting with friends in the neighborhood, sitting outside on her porch    - colon cancer screening at end of May    The 10-year ASCVD risk score (Beny HOGAN, et al., 2019) is: 6.7%    Values used to calculate the score:      Age: 64 years      Sex: Female      Is Non- : No      Diabetic: No      Tobacco smoker: No      Systolic Blood Pressure: 130 mmHg      Is BP treated: Yes      HDL Cholesterol: 50 mg/dL      Total Cholesterol: 206 mg/dL      Advance Care Planning    Discussed advance care planning with patient; however, patient declined at this time.        2025   General Health   How would you rate your overall physical health? Good   Feel stress (tense, anxious, or unable to sleep) Not at all         2025   Nutrition   Three or more servings of calcium each day? (!) NO   Diet: Regular (no restrictions)   How many servings of fruit and vegetables per day? (!) 2-3   How many sweetened beverages each day? 0-1         2025   Exercise   Days per week of moderate/strenous exercise 3 days   Average minutes spent exercising at this level 20 min         2025   Social Factors   Frequency of gathering with friends or relatives Twice a week   Worry food won't last  until get money to buy more No   Food not last or not have enough money for food? No   Do you have housing? (Housing is defined as stable permanent housing and does not include staying ouside in a car, in a tent, in an abandoned building, in an overnight shelter, or couch-surfing.) Yes   Are you worried about losing your housing? No   Lack of transportation? No   Unable to get utilities (heat,electricity)? No         4/23/2025   Fall Risk   Fallen 2 or more times in the past year? No   Trouble with walking or balance? No          4/23/2025   Dental   Dentist two times every year? Yes         Today's PHQ-2 Score:       4/23/2025     8:39 AM   PHQ-2 ( 1999 Pfizer)   Q1: Little interest or pleasure in doing things 0   Q2: Feeling down, depressed or hopeless 0   PHQ-2 Score 0    Q1: Little interest or pleasure in doing things Not at all   Q2: Feeling down, depressed or hopeless Not at all   PHQ-2 Score 0       Patient-reported           4/23/2025   Substance Use   Alcohol more than 3/day or more than 7/wk No   Do you use any other substances recreationally? No     Social History     Tobacco Use    Smoking status: Never     Passive exposure: Never    Smokeless tobacco: Never   Vaping Use    Vaping status: Never Used   Substance Use Topics    Alcohol use: Yes     Comment: 1-2 drinks/wk    Drug use: No           11/1/2023   LAST FHS-7 RESULTS   1st degree relative breast or ovarian cancer No   Any relative bilateral breast cancer No   Any male have breast cancer No   Any ONE woman have BOTH breast AND ovarian cancer Yes   Any woman with breast cancer before 50yrs No   2 or more relatives with breast AND/OR ovarian cancer Yes   2 or more relatives with breast AND/OR bowel cancer Yes        Mammogram Screening - Mammogram every 1-2 years updated in Health Maintenance based on mutual decision making        4/23/2025   STI Screening   New sexual partner(s) since last STI/HIV test? No     History of abnormal Pap smear: No - age  "30- 64 PAP with HPV every 5 years recommended        5/16/2019    10:53 AM 1/30/2009    12:00 AM 8/12/2005    12:00 AM   PAP / HPV   PAP (Historical)  NIL  NIL    PAP-ABSTRACT See Scanned Document             This result is from an external source.     ASCVD Risk   The 10-year ASCVD risk score (Beny HOGAN, et al., 2019) is: 6.7%    Values used to calculate the score:      Age: 64 years      Sex: Female      Is Non- : No      Diabetic: No      Tobacco smoker: No      Systolic Blood Pressure: 130 mmHg      Is BP treated: Yes      HDL Cholesterol: 50 mg/dL      Total Cholesterol: 206 mg/dL         Reviewed and updated as needed this visit by Provider                       Objective    Exam  /88 (BP Location: Right arm, Patient Position: Sitting, Cuff Size: Adult Large)   Pulse 81   Temp 97.4  F (36.3  C) (Tympanic)   Resp 16   Ht 1.613 m (5' 3.5\")   Wt 130.1 kg (286 lb 14.4 oz)   LMP 01/05/2009   SpO2 98%   BMI 50.02 kg/m     Estimated body mass index is 50.02 kg/m  as calculated from the following:    Height as of this encounter: 1.613 m (5' 3.5\").    Weight as of this encounter: 130.1 kg (286 lb 14.4 oz).    Physical Exam  GENERAL: alert and no distress  EYES: Eyes grossly normal to inspection, PERRL and conjunctivae and sclerae normal  HENT: ear canals and TM's normal, nose and mouth without ulcers or lesions  NECK: no adenopathy, no asymmetry, masses, or scars  RESP: lungs clear to auscultation - no rales, rhonchi or wheezes  CV: regular rate and rhythm, normal S1 S2, no S3 or S4, no murmur, click or rub, no peripheral edema  ABDOMEN: soft, nontender, no hepatosplenomegaly, no masses and bowel sounds normal  MS: no gross musculoskeletal defects noted, no edema  SKIN: no suspicious lesions or rashes  NEURO: Normal strength and tone, mentation intact and speech normal  PSYCH: mentation appears normal, affect normal/bright    The longitudinal plan of care for the " diagnosis(es)/condition(s) as documented were addressed during this visit. Due to the added complexity in care, I will continue to support Debo in the subsequent management and with ongoing continuity of care.    Signed Electronically by: Carmine Keith MD

## 2025-04-24 LAB
ALBUMIN SERPL BCG-MCNC: 3.9 G/DL (ref 3.5–5.2)
ALP SERPL-CCNC: 71 U/L (ref 40–150)
ALT SERPL W P-5'-P-CCNC: 15 U/L (ref 0–50)
ANION GAP SERPL CALCULATED.3IONS-SCNC: 12 MMOL/L (ref 7–15)
AST SERPL W P-5'-P-CCNC: 21 U/L (ref 0–45)
BILIRUB SERPL-MCNC: 0.2 MG/DL
BUN SERPL-MCNC: 13.4 MG/DL (ref 8–23)
CALCIUM SERPL-MCNC: 9.5 MG/DL (ref 8.8–10.4)
CHLORIDE SERPL-SCNC: 100 MMOL/L (ref 98–107)
CHOLEST SERPL-MCNC: 264 MG/DL
CREAT SERPL-MCNC: 0.79 MG/DL (ref 0.51–0.95)
EGFRCR SERPLBLD CKD-EPI 2021: 84 ML/MIN/1.73M2
FASTING STATUS PATIENT QL REPORTED: YES
FASTING STATUS PATIENT QL REPORTED: YES
GLUCOSE SERPL-MCNC: 103 MG/DL (ref 70–99)
HCO3 SERPL-SCNC: 24 MMOL/L (ref 22–29)
HDLC SERPL-MCNC: 52 MG/DL
LDLC SERPL CALC-MCNC: 170 MG/DL
NONHDLC SERPL-MCNC: 212 MG/DL
POTASSIUM SERPL-SCNC: 4.6 MMOL/L (ref 3.4–5.3)
PROT SERPL-MCNC: 7.4 G/DL (ref 6.4–8.3)
SODIUM SERPL-SCNC: 136 MMOL/L (ref 135–145)
TRIGL SERPL-MCNC: 209 MG/DL
VIT D+METAB SERPL-MCNC: 20 NG/ML (ref 20–50)